# Patient Record
Sex: FEMALE | HISPANIC OR LATINO | Employment: UNEMPLOYED | URBAN - METROPOLITAN AREA
[De-identification: names, ages, dates, MRNs, and addresses within clinical notes are randomized per-mention and may not be internally consistent; named-entity substitution may affect disease eponyms.]

---

## 2017-09-14 ENCOUNTER — APPOINTMENT (OUTPATIENT)
Dept: RADIOLOGY | Facility: MEDICAL CENTER | Age: 67
End: 2017-09-14
Attending: EMERGENCY MEDICINE
Payer: MEDICAID

## 2017-09-14 ENCOUNTER — RESOLUTE PROFESSIONAL BILLING HOSPITAL PROF FEE (OUTPATIENT)
Dept: HOSPITALIST | Facility: MEDICAL CENTER | Age: 67
End: 2017-09-14
Payer: MEDICAID

## 2017-09-14 ENCOUNTER — HOSPITAL ENCOUNTER (OUTPATIENT)
Facility: MEDICAL CENTER | Age: 67
End: 2017-09-15
Attending: EMERGENCY MEDICINE | Admitting: HOSPITALIST
Payer: MEDICAID

## 2017-09-14 PROBLEM — Z85.3 HISTORY OF BREAST CANCER: Status: ACTIVE | Noted: 2017-09-14

## 2017-09-14 PROBLEM — E87.1 HYPONATREMIA: Status: ACTIVE | Noted: 2017-09-14

## 2017-09-14 PROBLEM — I10 HYPERTENSION: Status: ACTIVE | Noted: 2017-09-14

## 2017-09-14 PROBLEM — E87.6 HYPOKALEMIA: Status: ACTIVE | Noted: 2017-09-14

## 2017-09-14 PROBLEM — I20.9 ANGINA PECTORIS (HCC): Status: ACTIVE | Noted: 2017-09-14

## 2017-09-14 PROBLEM — F41.9 ANXIETY: Status: ACTIVE | Noted: 2017-09-14

## 2017-09-14 LAB
ALBUMIN SERPL BCP-MCNC: 4.4 G/DL (ref 3.2–4.9)
ALBUMIN/GLOB SERPL: 1.2 G/DL
ALP SERPL-CCNC: 97 U/L (ref 30–99)
ALT SERPL-CCNC: 32 U/L (ref 2–50)
ANION GAP SERPL CALC-SCNC: 10 MMOL/L (ref 0–11.9)
AST SERPL-CCNC: 26 U/L (ref 12–45)
BASOPHILS # BLD AUTO: 0.4 % (ref 0–1.8)
BASOPHILS # BLD: 0.04 K/UL (ref 0–0.12)
BILIRUB SERPL-MCNC: 1 MG/DL (ref 0.1–1.5)
BUN SERPL-MCNC: 12 MG/DL (ref 8–22)
CALCIUM SERPL-MCNC: 9.3 MG/DL (ref 8.4–10.2)
CHLORIDE SERPL-SCNC: 99 MMOL/L (ref 96–112)
CO2 SERPL-SCNC: 24 MMOL/L (ref 20–33)
CREAT SERPL-MCNC: 0.76 MG/DL (ref 0.5–1.4)
DEPRECATED D DIMER PPP IA-ACNC: <200 NG/ML(D-DU)
EKG IMPRESSION: NORMAL
EOSINOPHIL # BLD AUTO: 0.08 K/UL (ref 0–0.51)
EOSINOPHIL NFR BLD: 0.7 % (ref 0–6.9)
ERYTHROCYTE [DISTWIDTH] IN BLOOD BY AUTOMATED COUNT: 36 FL (ref 35.9–50)
GFR SERPL CREATININE-BSD FRML MDRD: >60 ML/MIN/1.73 M 2
GLOBULIN SER CALC-MCNC: 3.6 G/DL (ref 1.9–3.5)
GLUCOSE SERPL-MCNC: 95 MG/DL (ref 65–99)
HCT VFR BLD AUTO: 41.1 % (ref 37–47)
HGB BLD-MCNC: 14.6 G/DL (ref 12–16)
IMM GRANULOCYTES # BLD AUTO: 0.04 K/UL (ref 0–0.11)
IMM GRANULOCYTES NFR BLD AUTO: 0.4 % (ref 0–0.9)
LYMPHOCYTES # BLD AUTO: 2.88 K/UL (ref 1–4.8)
LYMPHOCYTES NFR BLD: 26.8 % (ref 22–41)
MAGNESIUM SERPL-MCNC: 2 MG/DL (ref 1.5–2.5)
MCH RBC QN AUTO: 29.2 PG (ref 27–33)
MCHC RBC AUTO-ENTMCNC: 35.5 G/DL (ref 33.6–35)
MCV RBC AUTO: 82.2 FL (ref 81.4–97.8)
MONOCYTES # BLD AUTO: 0.83 K/UL (ref 0–0.85)
MONOCYTES NFR BLD AUTO: 7.7 % (ref 0–13.4)
NEUTROPHILS # BLD AUTO: 6.88 K/UL (ref 2–7.15)
NEUTROPHILS NFR BLD: 64 % (ref 44–72)
NRBC # BLD AUTO: 0 K/UL
NRBC BLD AUTO-RTO: 0 /100 WBC
PLATELET # BLD AUTO: 204 K/UL (ref 164–446)
PMV BLD AUTO: 9 FL (ref 9–12.9)
POTASSIUM SERPL-SCNC: 3.4 MMOL/L (ref 3.6–5.5)
PROT SERPL-MCNC: 8 G/DL (ref 6–8.2)
RBC # BLD AUTO: 5 M/UL (ref 4.2–5.4)
SODIUM SERPL-SCNC: 133 MMOL/L (ref 135–145)
TROPONIN I SERPL-MCNC: <0.02 NG/ML (ref 0–0.04)
TROPONIN I SERPL-MCNC: <0.02 NG/ML (ref 0–0.04)
TSH SERPL DL<=0.005 MIU/L-ACNC: 1.12 UIU/ML (ref 0.35–5.5)
WBC # BLD AUTO: 10.8 K/UL (ref 4.8–10.8)

## 2017-09-14 PROCEDURE — 94760 N-INVAS EAR/PLS OXIMETRY 1: CPT

## 2017-09-14 PROCEDURE — 84443 ASSAY THYROID STIM HORMONE: CPT

## 2017-09-14 PROCEDURE — 99285 EMERGENCY DEPT VISIT HI MDM: CPT

## 2017-09-14 PROCEDURE — 700111 HCHG RX REV CODE 636 W/ 250 OVERRIDE (IP): Performed by: HOSPITALIST

## 2017-09-14 PROCEDURE — 71010 DX-CHEST-PORTABLE (1 VIEW): CPT

## 2017-09-14 PROCEDURE — 83036 HEMOGLOBIN GLYCOSYLATED A1C: CPT

## 2017-09-14 PROCEDURE — 84484 ASSAY OF TROPONIN QUANT: CPT

## 2017-09-14 PROCEDURE — 85025 COMPLETE CBC W/AUTO DIFF WBC: CPT

## 2017-09-14 PROCEDURE — 85379 FIBRIN DEGRADATION QUANT: CPT

## 2017-09-14 PROCEDURE — G0378 HOSPITAL OBSERVATION PER HR: HCPCS

## 2017-09-14 PROCEDURE — A9270 NON-COVERED ITEM OR SERVICE: HCPCS | Performed by: EMERGENCY MEDICINE

## 2017-09-14 PROCEDURE — 73030 X-RAY EXAM OF SHOULDER: CPT | Mod: LT

## 2017-09-14 PROCEDURE — 700102 HCHG RX REV CODE 250 W/ 637 OVERRIDE(OP): Performed by: HOSPITALIST

## 2017-09-14 PROCEDURE — A9270 NON-COVERED ITEM OR SERVICE: HCPCS | Performed by: HOSPITALIST

## 2017-09-14 PROCEDURE — 700102 HCHG RX REV CODE 250 W/ 637 OVERRIDE(OP): Performed by: EMERGENCY MEDICINE

## 2017-09-14 PROCEDURE — 80053 COMPREHEN METABOLIC PANEL: CPT

## 2017-09-14 PROCEDURE — 99220 PR INITIAL OBSERVATION CARE,LEVL III: CPT | Performed by: HOSPITALIST

## 2017-09-14 PROCEDURE — 93005 ELECTROCARDIOGRAM TRACING: CPT | Performed by: EMERGENCY MEDICINE

## 2017-09-14 PROCEDURE — 83735 ASSAY OF MAGNESIUM: CPT

## 2017-09-14 PROCEDURE — 36415 COLL VENOUS BLD VENIPUNCTURE: CPT

## 2017-09-14 RX ORDER — AMOXICILLIN 250 MG
2 CAPSULE ORAL 2 TIMES DAILY
Status: DISCONTINUED | OUTPATIENT
Start: 2017-09-14 | End: 2017-09-15 | Stop reason: HOSPADM

## 2017-09-14 RX ORDER — ASPIRIN 325 MG
325 TABLET ORAL DAILY
Status: DISCONTINUED | OUTPATIENT
Start: 2017-09-14 | End: 2017-09-14

## 2017-09-14 RX ORDER — ACETAMINOPHEN 325 MG/1
650 TABLET ORAL ONCE
Status: COMPLETED | OUTPATIENT
Start: 2017-09-14 | End: 2017-09-14

## 2017-09-14 RX ORDER — POLYETHYLENE GLYCOL 3350 17 G/17G
1 POWDER, FOR SOLUTION ORAL
Status: DISCONTINUED | OUTPATIENT
Start: 2017-09-14 | End: 2017-09-15 | Stop reason: HOSPADM

## 2017-09-14 RX ORDER — ONDANSETRON 4 MG/1
4 TABLET, ORALLY DISINTEGRATING ORAL EVERY 4 HOURS PRN
Status: DISCONTINUED | OUTPATIENT
Start: 2017-09-14 | End: 2017-09-15 | Stop reason: HOSPADM

## 2017-09-14 RX ORDER — ZOLPIDEM TARTRATE 5 MG/1
5 TABLET ORAL
Status: DISCONTINUED | OUTPATIENT
Start: 2017-09-14 | End: 2017-09-15 | Stop reason: HOSPADM

## 2017-09-14 RX ORDER — NITROGLYCERIN 0.4 MG/1
0.4 TABLET SUBLINGUAL
Status: DISCONTINUED | OUTPATIENT
Start: 2017-09-14 | End: 2017-09-15 | Stop reason: HOSPADM

## 2017-09-14 RX ORDER — MORPHINE SULFATE 4 MG/ML
1-2 INJECTION, SOLUTION INTRAMUSCULAR; INTRAVENOUS
Status: DISCONTINUED | OUTPATIENT
Start: 2017-09-14 | End: 2017-09-15 | Stop reason: HOSPADM

## 2017-09-14 RX ORDER — POTASSIUM CHLORIDE 20 MEQ/1
20 TABLET, EXTENDED RELEASE ORAL DAILY
Status: DISCONTINUED | OUTPATIENT
Start: 2017-09-14 | End: 2017-09-15 | Stop reason: HOSPADM

## 2017-09-14 RX ORDER — BISACODYL 10 MG
10 SUPPOSITORY, RECTAL RECTAL
Status: DISCONTINUED | OUTPATIENT
Start: 2017-09-14 | End: 2017-09-15 | Stop reason: HOSPADM

## 2017-09-14 RX ORDER — ACETAMINOPHEN 325 MG/1
650 TABLET ORAL EVERY 6 HOURS PRN
Status: DISCONTINUED | OUTPATIENT
Start: 2017-09-14 | End: 2017-09-15 | Stop reason: HOSPADM

## 2017-09-14 RX ORDER — GABAPENTIN 300 MG/1
300 CAPSULE ORAL
COMMUNITY
End: 2022-01-31 | Stop reason: SDUPTHER

## 2017-09-14 RX ORDER — LOSARTAN POTASSIUM 25 MG/1
25 TABLET ORAL DAILY
COMMUNITY
End: 2022-01-31

## 2017-09-14 RX ORDER — ASPIRIN 81 MG/1
324 TABLET, CHEWABLE ORAL DAILY
Status: DISCONTINUED | OUTPATIENT
Start: 2017-09-14 | End: 2017-09-14

## 2017-09-14 RX ORDER — LOSARTAN POTASSIUM 25 MG/1
25 TABLET ORAL DAILY
Status: DISCONTINUED | OUTPATIENT
Start: 2017-09-14 | End: 2017-09-15 | Stop reason: HOSPADM

## 2017-09-14 RX ORDER — GABAPENTIN 300 MG/1
300 CAPSULE ORAL
Status: DISCONTINUED | OUTPATIENT
Start: 2017-09-14 | End: 2017-09-15 | Stop reason: HOSPADM

## 2017-09-14 RX ORDER — LABETALOL HYDROCHLORIDE 5 MG/ML
10 INJECTION, SOLUTION INTRAVENOUS EVERY 4 HOURS PRN
Status: DISCONTINUED | OUTPATIENT
Start: 2017-09-14 | End: 2017-09-15 | Stop reason: HOSPADM

## 2017-09-14 RX ORDER — PAROXETINE 10 MG/1
10 TABLET, FILM COATED ORAL DAILY
COMMUNITY
End: 2022-01-31 | Stop reason: SDUPTHER

## 2017-09-14 RX ORDER — ASPIRIN 600 MG/1
300 SUPPOSITORY RECTAL DAILY
Status: DISCONTINUED | OUTPATIENT
Start: 2017-09-14 | End: 2017-09-14

## 2017-09-14 RX ORDER — PAROXETINE 10 MG/1
10 TABLET, FILM COATED ORAL DAILY
Status: DISCONTINUED | OUTPATIENT
Start: 2017-09-14 | End: 2017-09-15 | Stop reason: HOSPADM

## 2017-09-14 RX ORDER — HEPARIN SODIUM 5000 [USP'U]/ML
5000 INJECTION, SOLUTION INTRAVENOUS; SUBCUTANEOUS EVERY 8 HOURS
Status: DISCONTINUED | OUTPATIENT
Start: 2017-09-14 | End: 2017-09-15 | Stop reason: HOSPADM

## 2017-09-14 RX ORDER — PROMETHAZINE HYDROCHLORIDE 25 MG/1
12.5-25 TABLET ORAL EVERY 4 HOURS PRN
Status: DISCONTINUED | OUTPATIENT
Start: 2017-09-14 | End: 2017-09-15 | Stop reason: HOSPADM

## 2017-09-14 RX ORDER — ONDANSETRON 2 MG/ML
4 INJECTION INTRAMUSCULAR; INTRAVENOUS EVERY 4 HOURS PRN
Status: DISCONTINUED | OUTPATIENT
Start: 2017-09-14 | End: 2017-09-15 | Stop reason: HOSPADM

## 2017-09-14 RX ORDER — PROMETHAZINE HYDROCHLORIDE 25 MG/1
12.5-25 SUPPOSITORY RECTAL EVERY 4 HOURS PRN
Status: DISCONTINUED | OUTPATIENT
Start: 2017-09-14 | End: 2017-09-15 | Stop reason: HOSPADM

## 2017-09-14 RX ADMIN — HEPARIN SODIUM 5000 UNITS: 5000 INJECTION, SOLUTION INTRAVENOUS; SUBCUTANEOUS at 20:24

## 2017-09-14 RX ADMIN — LOSARTAN POTASSIUM 25 MG: 25 TABLET, FILM COATED ORAL at 16:28

## 2017-09-14 RX ADMIN — ACETAMINOPHEN 650 MG: 325 TABLET, FILM COATED ORAL at 21:16

## 2017-09-14 RX ADMIN — GABAPENTIN 300 MG: 300 CAPSULE ORAL at 20:24

## 2017-09-14 RX ADMIN — POTASSIUM CHLORIDE 20 MEQ: 1500 TABLET, EXTENDED RELEASE ORAL at 16:27

## 2017-09-14 RX ADMIN — HEPARIN SODIUM 5000 UNITS: 5000 INJECTION, SOLUTION INTRAVENOUS; SUBCUTANEOUS at 16:29

## 2017-09-14 RX ADMIN — ACETAMINOPHEN 650 MG: 325 TABLET, FILM COATED ORAL at 13:15

## 2017-09-14 RX ADMIN — PAROXETINE 10 MG: 10 TABLET, FILM COATED ORAL at 16:28

## 2017-09-14 ASSESSMENT — PATIENT HEALTH QUESTIONNAIRE - PHQ9
2. FEELING DOWN, DEPRESSED, IRRITABLE, OR HOPELESS: NOT AT ALL
SUM OF ALL RESPONSES TO PHQ9 QUESTIONS 1 AND 2: 0
1. LITTLE INTEREST OR PLEASURE IN DOING THINGS: NOT AT ALL
SUM OF ALL RESPONSES TO PHQ QUESTIONS 1-9: 0

## 2017-09-14 ASSESSMENT — LIFESTYLE VARIABLES
EVER_SMOKED: NEVER
ALCOHOL_USE: NO
EVER_SMOKED: NEVER

## 2017-09-14 ASSESSMENT — PAIN SCALES - GENERAL
PAINLEVEL_OUTOF10: 9
PAINLEVEL_OUTOF10: 0
PAINLEVEL_OUTOF10: 8
PAINLEVEL_OUTOF10: 0

## 2017-09-14 NOTE — PROGRESS NOTES
Admit profile completed, assessment complete. Skin assessment completed. Medicated per mar. Plan of care discussed with family and pt.

## 2017-09-14 NOTE — ASSESSMENT & PLAN NOTE
Patient states that the chest pain developed yesterday it is squeezing in sensation  8 out of 10 intensity accompanied by nausea and diaphoresis but no shortness of breath. Initially the patient said it's nonradiating but now she says that it is radiating to her right and left arm. The patient at this point will be admitted to the telemetric unit for evaluation with serial cardiac markers and a normal stress test in the morning in the meantime I will optimize her with medications including aspirin beta blocker and nitroglycerin and morphine as well as oxygen per protocol.

## 2017-09-14 NOTE — ASSESSMENT & PLAN NOTE
Continue at this point with blood pressure management keep systolic blood pressure under 140 diastolic under 90.

## 2017-09-14 NOTE — H&P
Hospital Medicine History and Physical    Date of Service  9/14/2017    Chief Complaint  Chief Complaint   Patient presents with   • Shortness of Breath     x2-3 days   • Arm Pain     L arm pain x2-3 days. starts at shoulder and radiates down arm,       History of Presenting Illness  66 y.o. female who presented 9/14/2017 with ongoing chest pain. Patient developed chest pain yesterday evening and is 8 out of 10 intensity accompanied by squeezing sensation nausea and diaphoresis. The patient does have risk factors with family as well as her hypertension the patient does at this point will be admitted to the telemetric unit for evaluation with surgery cardiac markers telemetric monitoring as well as a stress test in the morning. She will be optimized on medical management in the meantime. We will give her IV morphine 1-2 mg every 4 hours when necessary for severe pain.   Primary Care Physician  Nic Parsons M.D.    Consultants  None    Code Status  Full code    Review of Systems  ROS  Complaints of chest pain that is squeezing, complaints of nausea, complains of diaphoresis, complains of back pain, complains of left arm numbness. Denies any shortness of breath denies any abdominal pain denies any problems with leg pain or leg swelling all other review systems were reviewed and are negative.    Past Medical History  Past Medical History:   Diagnosis Date   • Cancer (CMS-HCC)     breast   • Hypertension    • Psychiatric disorder     anxiety       Surgical History  Past Surgical History:   Procedure Laterality Date   • RAIN BY LAPAROSCOPY  1/18/2010    Performed by NATASHA SCHWARTZ at SURGERY [facility] ORS   • LUMPECTOMY         Medications  No current facility-administered medications on file prior to encounter.      No current outpatient prescriptions on file prior to encounter.       Family History  History reviewed. No pertinent family history.    Social History  Social History   Substance Use Topics   •  Smoking status: Never Smoker   • Smokeless tobacco: Never Used   • Alcohol use No       Allergies  Allergies   Allergen Reactions   • Advil [Ibuprofen Micronized]    • Aspirin         Physical Exam  Laboratory   Hemodynamics  No data recorded.      Pulse  Av.3  Min: 84  Max: 93    Blood Pressure: 147/92, NIBP: 134/64      Respiratory      Respiration: 18, Pulse Oximetry: 95 %             Physical Exam  66-year-old female who appears to be in no acute distress. She does have left arm numbness.  Head is atraumatic eyes follow in normal range of gaze, pupils are equal round reactive bilaterally sclerae anicteric conjunctiva is of normal hue.  Midline without discharge no nasal fracture, no epistaxis.  Ears bilaterally are intact no mastoid tenderness no discharge from the ear canals are token also patent.  Oral cavity moist mucous members no piriformis infection in the oral cavity.  Neck soft supple no JVD carotid bruits thyromegaly or lymphadenopathy appreciated.  Chest will seek with inspiration and expiration no paradoxical motion no reversible chest with tenderness.  Heart S1-S2 no murmurs or gallops detected.  Lungs diminished breath sounds at the bases mild atelectasis no rales no rhonchi.  Abdomen is soft bowel sounds present all 4 quadrants no hepatomegaly, no splenomegaly, no rebound, no tenderness.  Gutierrez exam deferred, rectal exam deferred.  Upper and lower extremity is positive pulses no edema good muscle strength and muscle tone positive deep tendon reflexes patient does favor her left arm which at this point seems to be decreased in strength and and its toll.  Skin normal turgor no rashes or abrasions identified.  Neurologically cranial nerves II 12 grossly within normal limits without any focal deficits appreciated.   Recent Labs      17   1155   WBC  10.8   RBC  5.00   HEMOGLOBIN  14.6   HEMATOCRIT  41.1   MCV  82.2   MCH  29.2   MCHC  35.5*   RDW  36.0   PLATELETCT  204   MPV  9.0     Recent  Labs      09/14/17   1155   SODIUM  133*   POTASSIUM  3.4*   CHLORIDE  99   CO2  24   GLUCOSE  95   BUN  12   CREATININE  0.76   CALCIUM  9.3     Recent Labs      09/14/17   1155   ALTSGPT  32   ASTSGOT  26   ALKPHOSPHAT  97   TBILIRUBIN  1.0   GLUCOSE  95                 Lab Results   Component Value Date    TROPONINI <0.02 09/14/2017     Urinalysis:  No results found for: SPECGRAVITY, GLUCOSEUR, KETONES, NITRITE, WBCURINE, RBCURINE, BACTERIA, EPITHELCELL     Imaging  DX-SHOULDER 2+ LEFT   Final Result      No evidence of fracture or arthropathy.      DX-CHEST-PORTABLE (1 VIEW)   Final Result      No evidence of acute cardiopulmonary process.      NM-CARDIAC STRESS TEST    (Results Pending)        Assessment/Plan     I anticipate this patient is appropriate for observation status at this time.    Angina pectoris (CMS-[facility])- (present on admission)   Assessment & Plan    Patient states that the chest pain developed yesterday it is squeezing in sensation  8 out of 10 intensity accompanied by nausea and diaphoresis but no shortness of breath. Initially the patient said it's nonradiating but now she says that it is radiating to her right and left arm. The patient at this point will be admitted to the telemetric unit for evaluation with serial cardiac markers and a normal stress test in the morning in the meantime I will optimize her with medications including aspirin beta blocker and nitroglycerin and morphine as well as oxygen per protocol.        Hypertension- (present on admission)   Assessment & Plan    Continue at this point with blood pressure management keep systolic blood pressure under 140 diastolic under 90.        Hyponatremia- (present on admission)   Assessment & Plan    Sodium is low at 133 give fluid resuscitation and monitor sodium levels.        Hypokalemia- (present on admission)   Assessment & Plan    Potassium is low at 3.4 we'll supplement and monitor potassium levels.        Anxiety- (present on  admission)   Assessment & Plan    Continue an anxiolytic management.        History of breast cancer- (present on admission)   Assessment & Plan    In remission and stable.            VTE prophylaxis:Sequentials .

## 2017-09-14 NOTE — ED NOTES
Chief Complaint   Patient presents with   • Shortness of Breath     x2-3 days   • Arm Pain     L arm pain x2-3 days. starts at shoulder and radiates down arm,

## 2017-09-14 NOTE — ED PROVIDER NOTES
ED Provider Note    CHIEF COMPLAINT  Chief Complaint   Patient presents with   • Shortness of Breath     x2-3 days   • Arm Pain     L arm pain x2-3 days. starts at shoulder and radiates down arm,       HPI  Karyn Arshad is a 66 y.o. female who presentsFor evaluation of chest pain shortness of redness for the past to 3 days. The patient also complains left arm pain when she lifts her arm. She denies any pleuritic pain. She denies any history of coronary artery disease. The patient has had a little diaphoresis. Nothing seems to make her symptoms worse or better she has a history of cancer to the breast hypertension and anxiety. She has no pain radiating through to the back.    REVIEW OF SYSTEMS  See HPI for further details. All other systems reviewed and negative except as noted above    PAST MEDICAL HISTORY  Past Medical History:   Diagnosis Date   • Cancer (CMS-HCC)     breast   • Hypertension    • Psychiatric disorder     anxiety       FAMILY HISTORY  History reviewed. No pertinent family history.    SOCIAL HISTORY  Social History     Social History   • Marital status:      Spouse name: N/A   • Number of children: N/A   • Years of education: N/A     Social History Main Topics   • Smoking status: Never Smoker   • Smokeless tobacco: Never Used   • Alcohol use No   • Drug use: No   • Sexual activity: Not on file     Other Topics Concern   • Not on file     Social History Narrative   • No narrative on file       SURGICAL HISTORY  Past Surgical History:   Procedure Laterality Date   • RAIN BY LAPAROSCOPY  1/18/2010    Performed by NATASHA SCHWARTZ at SURGERY [facility]   • LUMPECTOMY         CURRENT MEDICATIONS  Home Medications     Reviewed by Abhishek Fagan M.D. (Physician) on 09/14/17 at 1504  Med List Status: Complete   Medication Last Dose Status   gabapentin (NEURONTIN) 300 MG Cap  Active   losartan (COZAAR) 25 MG Tab  Active   paroxetine (PAXIL) 10 MG Tab  Active                  ALLERGIES  Allergies   Allergen Reactions   • Advil [Ibuprofen Micronized]    • Aspirin        PHYSICAL EXAM  VITAL SIGNS: /92   Pulse 78   Resp 18   Wt 72 kg (158 lb 11.7 oz)   SpO2 93%   BMI 31.00 kg/m²    Constitutional :  Well developed, Well nourished, No acute distress, Non-toxic appearance.   HENT:His atraumatic normocephalic moist mucous membranes  Eyes: Normal appearing nonicteric  Neck: Normal range of motion, No tenderness, Supple, No stridor.   Lymphatic: No cervical adenopathy    Cardiovascular: Normal heart rate, Normal rhythm, No murmurs, No rubs, No gallops.   Thorax & Lungs: Equal breath sounds bilaterally no rales rhonchi no chest wall tenderness  Skin: Warm, Dry, No erythema, No rash.   Abdomen soft nontender no distention  Chest walls nontender move symmetrically  Left shoulder is notable for pain to the shoulder with lifting of the left arm reproducing her pain distal pulses intact in the radial ulnar area  Neurologic the patient is awake alert without focal findings  Psychiatric appropriate mood and affect. No impairment of judgment      DX-SHOULDER 2+ LEFT   Final Result      No evidence of fracture or arthropathy.      DX-CHEST-PORTABLE (1 VIEW)   Final Result      No evidence of acute cardiopulmonary process.      NM-CARDIAC STRESS TEST    (Results Pending)       Results for orders placed or performed during the hospital encounter of 09/14/17   CBC w/ Differential   Result Value Ref Range    WBC 10.8 4.8 - 10.8 K/uL    RBC 5.00 4.20 - 5.40 M/uL    Hemoglobin 14.6 12.0 - 16.0 g/dL    Hematocrit 41.1 37.0 - 47.0 %    MCV 82.2 81.4 - 97.8 fL    MCH 29.2 27.0 - 33.0 pg    MCHC 35.5 (H) 33.6 - 35.0 g/dL    RDW 36.0 35.9 - 50.0 fL    Platelet Count 204 164 - 446 K/uL    MPV 9.0 9.0 - 12.9 fL    Neutrophils-Polys 64.00 44.00 - 72.00 %    Lymphocytes 26.80 22.00 - 41.00 %    Monocytes 7.70 0.00 - 13.40 %    Eosinophils 0.70 0.00 - 6.90 %    Basophils 0.40 0.00 - 1.80 %    Immature  Granulocytes 0.40 0.00 - 0.90 %    Nucleated RBC 0.00 /100 WBC    Neutrophils (Absolute) 6.88 2.00 - 7.15 K/uL    Lymphs (Absolute) 2.88 1.00 - 4.80 K/uL    Monos (Absolute) 0.83 0.00 - 0.85 K/uL    Eos (Absolute) 0.08 0.00 - 0.51 K/uL    Baso (Absolute) 0.04 0.00 - 0.12 K/uL    Immature Granulocytes (abs) 0.04 0.00 - 0.11 K/uL    NRBC (Absolute) 0.00 K/uL   Complete Metabolic Panel (CMP)   Result Value Ref Range    Sodium 133 (L) 135 - 145 mmol/L    Potassium 3.4 (L) 3.6 - 5.5 mmol/L    Chloride 99 96 - 112 mmol/L    Co2 24 20 - 33 mmol/L    Anion Gap 10.0 0.0 - 11.9    Glucose 95 65 - 99 mg/dL    Bun 12 8 - 22 mg/dL    Creatinine 0.76 0.50 - 1.40 mg/dL    Calcium 9.3 8.4 - 10.2 mg/dL    AST(SGOT) 26 12 - 45 U/L    ALT(SGPT) 32 2 - 50 U/L    Alkaline Phosphatase 97 30 - 99 U/L    Total Bilirubin 1.0 0.1 - 1.5 mg/dL    Albumin 4.4 3.2 - 4.9 g/dL    Total Protein 8.0 6.0 - 8.2 g/dL    Globulin 3.6 (H) 1.9 - 3.5 g/dL    A-G Ratio 1.2 g/dL   Troponin STAT   Result Value Ref Range    Troponin I <0.02 0.00 - 0.04 ng/mL   D-Dimer (only helpful in low pre-test probability wells critieria. Do not order if patient ruled out by PERC criteria. See Weblinks at top of Labs section)   Result Value Ref Range    D-Dimer Screen <200 <250 ng/mL(D-DU)   ESTIMATED GFR   Result Value Ref Range    GFR If African American >60 >60 mL/min/1.73 m 2    GFR If Non African American >60 >60 mL/min/1.73 m 2   EKG (NOW)   Result Value Ref Range    Report       [facility] Emergency Dept.    Test Date:  2017  Pt Name:    SHAHEEN VALENZUELA             Department: [facility]  MRN:        [id number]                      Room:       [facility] 8  Gender:     F                            Technician: TALAT  :        1950                   Requested By:BIRD FITZGERALD  Order #:    [id number]                    Reading MD: BIRD FITZGERALD MD    Measurements  Intervals                                Axis  Rate:       89                            P:          35  WA:         148                          QRS:        5  QRSD:       74                           T:          5  QT:         368  QTc:        448    Interpretive Statements  SINUS RHYTHM  Compared to ECG 01/17/2010 05:44:27  No significant changes    Electronically Signed On 9- 15:20:59 PDT by TETO FITZGERALD MD       EKG Interpretation    Interpreted by me    Rhythm: normal sinus   Rate: normal  Axis: normal  Ectopy: none  Conduction: normal  ST Segments: no acute change  T Waves: no acute change  Q Waves: none    Clinical Impression: no acute changes and normal EKG      COURSE & MEDICAL DECISION MAKING  Pertinent Labs & Imaging studies reviewed. (See chart for details)  The patient presents with a complaint of chest pain intermittently for 2-3 days she does have risk factors for heart disease such as hypertension. Given the character and presentation I think she should be admitted to rule out acute coronary syndrome. In addition she has pain to her left shoulder worse with movement I suspect this is some type of tendinitis possible ligamentous origin. Discussed case with hospitalist Dr. Lo will be admitting. She has no signs or symptoms of aortic dissection pulmonary embolus with a negative d-dimer    FINAL IMPRESSION  1. Acute chest pain rule out ACS  2. Left shoulder pain suspect tendinitis  3.      Electronically signed by: Teto Fitzgerald, 9/14/2017

## 2017-09-15 ENCOUNTER — APPOINTMENT (OUTPATIENT)
Dept: RADIOLOGY | Facility: MEDICAL CENTER | Age: 67
End: 2017-09-15
Attending: HOSPITALIST
Payer: MEDICAID

## 2017-09-15 ENCOUNTER — PATIENT OUTREACH (OUTPATIENT)
Dept: HEALTH INFORMATION MANAGEMENT | Facility: OTHER | Age: 67
End: 2017-09-15

## 2017-09-15 VITALS
DIASTOLIC BLOOD PRESSURE: 62 MMHG | BODY MASS INDEX: 37.7 KG/M2 | HEART RATE: 73 BPM | TEMPERATURE: 98.6 F | OXYGEN SATURATION: 93 % | SYSTOLIC BLOOD PRESSURE: 126 MMHG | RESPIRATION RATE: 18 BRPM | HEIGHT: 55 IN | WEIGHT: 162.92 LBS

## 2017-09-15 LAB
ANION GAP SERPL CALC-SCNC: 9 MMOL/L (ref 0–11.9)
BUN SERPL-MCNC: 14 MG/DL (ref 8–22)
CALCIUM SERPL-MCNC: 9 MG/DL (ref 8.4–10.2)
CHLORIDE SERPL-SCNC: 101 MMOL/L (ref 96–112)
CO2 SERPL-SCNC: 24 MMOL/L (ref 20–33)
CREAT SERPL-MCNC: 0.74 MG/DL (ref 0.5–1.4)
ERYTHROCYTE [DISTWIDTH] IN BLOOD BY AUTOMATED COUNT: 36.2 FL (ref 35.9–50)
EST. AVERAGE GLUCOSE BLD GHB EST-MCNC: 120 MG/DL
GFR SERPL CREATININE-BSD FRML MDRD: >60 ML/MIN/1.73 M 2
GLUCOSE SERPL-MCNC: 125 MG/DL (ref 65–99)
HBA1C MFR BLD: 5.8 % (ref 0–5.6)
HCT VFR BLD AUTO: 40.6 % (ref 37–47)
HGB BLD-MCNC: 14.2 G/DL (ref 12–16)
LV EJECT FRACT  99904: 70
LV EJECT FRACT MOD 2C 99903: 76.37
LV EJECT FRACT MOD 4C 99902: 68.76
LV EJECT FRACT MOD BP 99901: 71.96
MCH RBC QN AUTO: 28.8 PG (ref 27–33)
MCHC RBC AUTO-ENTMCNC: 35 G/DL (ref 33.6–35)
MCV RBC AUTO: 82.4 FL (ref 81.4–97.8)
PLATELET # BLD AUTO: 206 K/UL (ref 164–446)
PMV BLD AUTO: 9 FL (ref 9–12.9)
POTASSIUM SERPL-SCNC: 3.5 MMOL/L (ref 3.6–5.5)
RBC # BLD AUTO: 4.93 M/UL (ref 4.2–5.4)
SODIUM SERPL-SCNC: 134 MMOL/L (ref 135–145)
TROPONIN I SERPL-MCNC: <0.02 NG/ML (ref 0–0.04)
WBC # BLD AUTO: 9 K/UL (ref 4.8–10.8)

## 2017-09-15 PROCEDURE — 93306 TTE W/DOPPLER COMPLETE: CPT | Mod: 26 | Performed by: INTERNAL MEDICINE

## 2017-09-15 PROCEDURE — 93306 TTE W/DOPPLER COMPLETE: CPT

## 2017-09-15 PROCEDURE — 94760 N-INVAS EAR/PLS OXIMETRY 1: CPT

## 2017-09-15 PROCEDURE — 84484 ASSAY OF TROPONIN QUANT: CPT

## 2017-09-15 PROCEDURE — G0378 HOSPITAL OBSERVATION PER HR: HCPCS

## 2017-09-15 PROCEDURE — 85027 COMPLETE CBC AUTOMATED: CPT

## 2017-09-15 PROCEDURE — 80048 BASIC METABOLIC PNL TOTAL CA: CPT

## 2017-09-15 PROCEDURE — 99217 PR OBSERVATION CARE DISCHARGE: CPT | Performed by: FAMILY MEDICINE

## 2017-09-15 PROCEDURE — A9270 NON-COVERED ITEM OR SERVICE: HCPCS | Performed by: HOSPITALIST

## 2017-09-15 PROCEDURE — 700111 HCHG RX REV CODE 636 W/ 250 OVERRIDE (IP): Performed by: HOSPITALIST

## 2017-09-15 PROCEDURE — 700102 HCHG RX REV CODE 250 W/ 637 OVERRIDE(OP): Performed by: HOSPITALIST

## 2017-09-15 PROCEDURE — 99285 EMERGENCY DEPT VISIT HI MDM: CPT

## 2017-09-15 PROCEDURE — 700111 HCHG RX REV CODE 636 W/ 250 OVERRIDE (IP)

## 2017-09-15 PROCEDURE — A9502 TC99M TETROFOSMIN: HCPCS

## 2017-09-15 RX ORDER — REGADENOSON 0.08 MG/ML
INJECTION, SOLUTION INTRAVENOUS
Status: COMPLETED
Start: 2017-09-15 | End: 2017-09-15

## 2017-09-15 RX ADMIN — HEPARIN SODIUM 5000 UNITS: 5000 INJECTION, SOLUTION INTRAVENOUS; SUBCUTANEOUS at 13:30

## 2017-09-15 RX ADMIN — PAROXETINE 10 MG: 10 TABLET, FILM COATED ORAL at 11:23

## 2017-09-15 RX ADMIN — POTASSIUM CHLORIDE 20 MEQ: 1500 TABLET, EXTENDED RELEASE ORAL at 11:23

## 2017-09-15 RX ADMIN — HEPARIN SODIUM 5000 UNITS: 5000 INJECTION, SOLUTION INTRAVENOUS; SUBCUTANEOUS at 05:49

## 2017-09-15 RX ADMIN — LOSARTAN POTASSIUM 25 MG: 25 TABLET, FILM COATED ORAL at 11:22

## 2017-09-15 RX ADMIN — REGADENOSON 0.4 MG: 0.08 INJECTION, SOLUTION INTRAVENOUS at 09:08

## 2017-09-15 RX ADMIN — DOCUSATE SODIUM AND SENNOSIDES 2 TABLET: 8.6; 5 TABLET, FILM COATED ORAL at 11:23

## 2017-09-15 ASSESSMENT — PAIN SCALES - GENERAL: PAINLEVEL_OUTOF10: 2

## 2017-09-15 ASSESSMENT — LIFESTYLE VARIABLES: EVER_SMOKED: NEVER

## 2017-09-15 NOTE — PROGRESS NOTES
Received bedside report from NOC RNAyse.  Discussed POC.  Pt sleeping in bed, low fowlers, no s/s of acute distress, calm, personal belongings w/in reach, safety precautions in place, assumed pt care, will CTM.

## 2017-09-15 NOTE — PROGRESS NOTES
Pt discharged home w/all personal belongings.  VSS and all lines discharged.  Pt discharged w/PCP and cardio follow up appt.  Pt verbalized understanding of all discharge instructions.  Pt transported to family vehicle via wheelchair and hospital escort (CNA).

## 2017-09-15 NOTE — CARE PLAN
Problem: Safety  Goal: Will remain free from falls  Instructed patient to call for assistance when needed.    Problem: Pain Management  Goal: Pain level will decrease to patient's comfort goal    Intervention: Follow pain managment plan developed in collaboration with patient and Interdisciplinary Team  Pain medication provided per physician order.

## 2017-09-15 NOTE — DISCHARGE INSTRUCTIONS
Discharge Instructions    Discharged to home by car with relative. Discharged via wheelchair, hospital escort: Yes.  Special equipment needed: Not Applicable    Be sure to schedule a follow-up appointment with your primary care doctor or any specialists as instructed.     Discharge Plan:   Diet Plan: Discussed  Activity Level: Discussed  Confirmed Follow up Appointment: Appointment Scheduled  Confirmed Symptoms Management: Discussed  Medication Reconciliation Updated: Yes  Influenza Vaccine Indication: Patient Refuses    I understand that a diet low in cholesterol, fat, and sodium is recommended for good health. Unless I have been given specific instructions below for another diet, I accept this instruction as my diet prescription.   Other diet: Regular    Special Instructions: None    · Is patient discharged on Warfarin / Coumadin?   No     · Is patient Post Blood Transfusion?  No    Depression / Suicide Risk    As you are discharged from this [facility] facility, it is important to learn how to keep safe from harming yourself.    Recognize the warning signs:  · Abrupt changes in personality, positive or negative- including increase in energy   · Giving away possessions  · Change in eating patterns- significant weight changes-  positive or negative  · Change in sleeping patterns- unable to sleep or sleeping all the time   · Unwillingness or inability to communicate  · Depression  · Unusual sadness, discouragement and loneliness  · Talk of wanting to die  · Neglect of personal appearance   · Rebelliousness- reckless behavior  · Withdrawal from people/activities they love  · Confusion- inability to concentrate     If you or a loved one observes any of these behaviors or has concerns about self-harm, here's what you can do:  · Talk about it- your feelings and reasons for harming yourself  · Remove any means that you might use to hurt yourself (examples: pills, rope, extension cords, firearm)  · Get professional  help from the community (Mental Health, Substance Abuse, psychological counseling)  · Do not be alone:Call your Safe Contact- someone whom you trust who will be there for you.  · Call your local CRISIS HOTLINE 096-8986 or 438-680-5649  · Call your local Children's Mobile Crisis Response Team [place] (385) 648-5877 or www.[employer]iam  · Call the toll free National Suicide Prevention Hotlines   · National Suicide Prevention Lifeline 286-157-AEWB (9811)  · National [employer] Line Network 800-SUICIDE (602-9498)        Angina de pecho  (Angina Pectoris)  La angina de pecho, a menudo llamada simplemente angina, es yaya sensación de molestia extrema en el pecho, el kelsea o el brazo, causada por la falta de yumi en la capa media y más gruesa de la pared del corazón (miocardio). Hay cuatro tipos de angina de pecho.  · Angina estable. La frecuencia y la duración de los episodios de angina estable son predecibles. Por lo general, la causa es la actividad física, el estrés emocional o la excitación. La angina estable suele durar unos pocos minutos y a menudo se lopez tomando un medicamento que se coloca debajo de la lengua, denominado nitroglicerina sublingual.  · Angina inestable. La angina inestable puede ocurrir aun cuando el cuerpo realiza poco o ningún esfuerzo físico, e incluso mientras duerme o descansa. Puede aumentar de repente en gravedad o frecuencia. No se puede aliviar con nitroglicerina sublingual y puede durar hasta 30 minutos.  · Angina microvascular. Enriqueta tipo de angina se produce por un trastorno de los vasos sanguíneos diminutos llamados arteriolas. Es más común en las mujeres. El dolor puede ser más intenso y durar más que otros tipos de angina de pecho.  · Angina de Prinzmetal o angina variante. Enriqueta tipo de angina de pecho es poco frecuente y por lo general ocurre cuando realiza poco o ningún esfuerzo físico. Ocurre sobre todo en las primeras horas de la mañana.  CAUSAS  La ateroesclerosis causa  la angina. Es la acumulación de grasa y colesterol (placa) en el interior de las arterias. Con el tiempo, la placa puede estrechar u obstruir la arteria y esto reducirá el flujo sanguíneo al corazón. La placa también puede debilitarse y desprenderse en yaya arteria coronaria para formar un coágulo y causar yaya obstrucción repentina.  FACTORES DE RIESGO  Los factores de riesgo comunes a hombres y mujeres incluyen lo siguiente:  · Niveles elevados de colesterol.  · Presión arterial elevada (hipertensión).  · Consumo de tabaco.  · Diabetes.  · Antecedentes familiares de angina.  · Obesidad.  · La falta de actividad física.  · Yaya dieta con alto contenido de grasas saturadas.  Las mujeres presentan un riesgo más alto de tener angina si:  · Tienen de 55 años.  · Están en la posmenopausia.  SÍNTOMAS  Muchas personas no presentan síntomas emma las primeras etapas de la angina. A medida que la afección progresa, los síntomas comunes a hombres y mujeres pueden incluir lo siguiente:  · Dolor en el pecho.  ¨ Se describe mary un dolor que aplasta o retuerce el pecho, o yaya opresión, presión, distensión o pesadez en el pecho.  ¨ El dolor puede durar más de unos minutos o puede detenerse y volver a presentarse.  · Dolor en los brazos, el kelsea, la mandíbula o la espalda.  · Acidez estomacal o empacho sin causa aparente.  · Falta de aire.  · Náuseas.  · Sudor frío repentino.  · Sensación repentina de desvanecimiento.  Muchas mujeres tienen molestias en el pecho y algunos de los otros síntomas. Sin embargo, a menudo presentan síntomas diferentes (atípicos), mary:   · Fatiga.  · Sensación inexplicable de nerviosismo o ansiedad.  · Debilidad sin causa aparente.  · Mareos o desmayos.  En ocasiones, las mujeres pueden tener angina sin presentar síntomas.  DIAGNÓSTICO   Entre los estudios para diagnosticar la angina se incluyen los siguientes:  · ECG (electrocardiograma).  · Prueba de esfuerzo. Se utiliza para detectar signos de  obstrucción cuando el corazón se somete a ejercicio.  · Prueba de esfuerzo farmacológica. Se utiliza para detectar signos de obstrucción cuando el corazón se somete a esfuerzo mediante el uso de un medicamento.  · Análisis de yumi.  · Angiografía coronaria. En srikanth procedimiento se detecta si hay obstrucción en las arterias coronarias.  TRATAMIENTO   El tratamiento de la angina puede incluir lo siguiente:  · Adquirir hábitos saludables para reducir o controlar los factores de riesgo.  · Medicamentos.  · Colocación de stent de arteria coronaria. Un stent ayuda a mantener abierta yaya arteria.  · Angioplastia coronaria. En srikanth procedimiento se ensancha yaya arteria estrechada u obstruida.  · Cirugía de revascularización arterial coronaria. Esta permitirá que la yumi pase la obstrucción (revascularización) para llegar al corazón.  INSTRUCCIONES PARA EL CUIDADO EN EL HOGAR   · Unicoi los medicamentos solamente mary se lo haya indicado el médico.  · No tome los siguientes medicamentos a menos que el médico lo autorice:  ¨ Antiinflamatorios no esteroides (PAXTON), mary ibuprofeno, naproxeno o celecoxib.  ¨ Suplementos vitamínicos que contienen vitamina A, vitamina E o ambas.  ¨ Tratamientos de reposición hormonal que contienen estrógeno con o sin progestina.  · Controle otros problemas de lorin, mary hipertensión y diabetes, mary se lo haya indicado jansen médico.  · Siga yaya dieta cardiosaludable. El nutricionista puede darle instrucciones sobre opciones de alimentos saludables y los cambios correspondientes.  · Use métodos de cocción saludables, mary asar, grillar, hervir, hornear, cocer al vapor o saltear. Hable con un nutricionista para aprender más acerca de los métodos de cocción saludables.  · Siga un programa de actividad física aprobado por el médico.  · Mantenga un peso saludable. Baje de peso según se lo indique el médico.  · Planifique períodos de descanso cuando se sienta fatigado.  · Aprenda a manejar el  estrés.  · No consuma ningún producto que contenga tabaco, lo que incluye cigarrillos, tabaco de mascar o cigarrillos electrónicos. Si necesita ayuda para dejar de fumar, consulte al médico.  · Si zeina alcohol y jansen médico lo aprueba, limite la ingesta a no más de 1 medida por día. Yaya medida equivale a 12 onzas de cerveza, 5 onzas de vino o 1½ onzas de bebidas alcohólicas de deangelo graduación.  · No consuma drogas.  · Concurra a todas las visitas de control mary se lo haya indicado el médico. Accord es importante.  SOLICITE ATENCIÓN MÉDICA DE INMEDIATO SI:   · Siente dolor en el pecho, el kelsea, el brazo, la mandíbula, el estómago o la espalda que dure más de unos pocos minutos, el dolor es recurrente o no se lopez con nitroglicerina sublingual.  · Tiene sudoraciones intensas sin causa.  · Tiene estos síntomas sin causa aparente:  ¨ Acidez estomacal o empacho.  ¨ Falta de aire o dificultades respiratorias.  ¨ Náuseas o vómitos.  ¨ Fatiga.  ¨ Nerviosismo o ansiedad.  ¨ Debilidad.  ¨ Diarrea.  · Mareos o sensación de desvanecimiento repentinos.  · Se desmaya.  Estos síntomas pueden representar un problema grave que constituye yaya emergencia. No espere hasta que los síntomas desaparezcan. Solicite atención médica de inmediato. Comuníquese con el servicio de emergencias de jansen localidad (911 en los [place]). No conduzca por robson propios medios hasta el hospital.     Esta información no tiene mary fin reemplazar el consejo del médico. Asegúrese de hacerle al médico cualquier pregunta que tenga.     Document Released: 12/18/2006 Document Revised: 01/08/2016  Elsevier Interactive Patient Education ©2016 Elsevier Inc.

## 2017-09-15 NOTE — PROGRESS NOTES
Administered prescribed meds per MAR.  Pt resting in bed, low fowlers, calm/cooperative, denies pain/any immediate needs at this time, pt's family at bedside, personal belongings w/in reach, safety precautions in place, room is clutter free, will CTM.

## 2017-09-15 NOTE — CARE PLAN
Problem: Communication  Goal: The ability to communicate needs accurately and effectively will improve  Outcome: PROGRESSING AS EXPECTED  This RN translating for pt (this RN certified in translation), encouraged pt to verbalize questions and concerns.    Problem: Knowledge Deficit  Goal: Knowledge of disease process/condition, treatment plan, diagnostic tests, and medications will improve  Outcome: PROGRESSING AS EXPECTED  Discussed POC, tx, and meds.  Pt verbalizes understanding of disease process.  Encouraged pt to ask questions.

## 2017-09-15 NOTE — DISCHARGE SUMMARY
CHIEF COMPLAINT ON ADMISSION  Chief Complaint   Patient presents with   • Shortness of Breath     x2-3 days   • Arm Pain     L arm pain x2-3 days. starts at shoulder and radiates down arm,       CODE STATUS  Full Code    HPI & HOSPITAL COURSE  This is a 66 y.o. female here with ATYPICAL CHEST PAIN AND HTN  SHE FEELS BETTER  IF CARDIAC STRESS TEST AND ECHO ARE NEGATIVE THEN DC HOME.  F/U WITH CARDIOLOGY AS AN OUTPT IN 1 TO 2 WEEKS    HTN  CONTINUE WITH LOSARTAN    Therefore, she is discharged in good and stable condition with close outpatient follow-up.    SPECIFIC OUTPATIENT FOLLOW-UP  WITH PCP IN 1 WEEK  CARDIOLOGY IN 1 TO 2 WEEKS    DISCHARGE PROBLEM LIST  Active Problems:    Angina pectoris (CMS-HCC) POA: Yes    Hypokalemia POA: Yes    Hyponatremia POA: Yes    Hypertension POA: Yes    History of breast cancer POA: Yes    Anxiety POA: Yes  Resolved Problems:    * No resolved hospital problems. *      FOLLOW UP  No future appointments.  Nic Parsons M.D.  [address]  [facility] [place] [address]  [phone]    Schedule an appointment as soon as possible for a visit in 2 weeks  Follow Up      MEDICATIONS ON DISCHARGE   Karyn Arshad   Home Medication Instructions ERICKA:[id number]    Printed on:09/15/17 3186   Medication Information                      gabapentin (NEURONTIN) 300 MG Cap  Take 300 mg by mouth every bedtime.             losartan (COZAAR) 25 MG Tab  Take 25 mg by mouth every day.             paroxetine (PAXIL) 10 MG Tab  Take 10 mg by mouth every day.                 DIET  Orders Placed This Encounter   Procedures   • Protocol 1313 Diet Order: Reg, No Decaf, No Caffeine - Cardiac Stress Test Pharmacological     Standing Status:   Standing     Number of Occurrences:   1     Order Specific Question:   Diet:     Answer:   Regular [1]     Order Specific Question:   Miscellaneous modifications:     Answer:   No Decaf, No Caffeine(for test) [11]     Comments:   Protocol 1313 Patient to have no  caffeine for 12 hours prior to exam (decaf, coffee, cola, tea, chocolate)       ACTIVITY  As tolerated.  Weight bearing as tolerated      CONSULTATIONS  NONE    PROCEDURES  NONE    LABORATORY  Lab Results   Component Value Date/Time    SODIUM 134 (L) 09/15/2017 12:04 AM    POTASSIUM 3.5 (L) 09/15/2017 12:04 AM    CHLORIDE 101 09/15/2017 12:04 AM    CO2 24 09/15/2017 12:04 AM    GLUCOSE 125 (H) 09/15/2017 12:04 AM    BUN 14 09/15/2017 12:04 AM    CREATININE 0.74 09/15/2017 12:04 AM        Lab Results   Component Value Date/Time    WBC 9.0 09/15/2017 12:04 AM    HEMOGLOBIN 14.2 09/15/2017 12:04 AM    HEMATOCRIT 40.6 09/15/2017 12:04 AM    PLATELETCT 206 09/15/2017 12:04 AM        Total time of the discharge process exceeds 36 minutes

## 2017-09-15 NOTE — PROGRESS NOTES
Pt's tele summary: sinus rhythm w/no ectopy.      HR 70      NM interval 0.16  QRS complex 0.08  QT interval 0.42          This tele strip placed in pt's chart.

## 2017-09-15 NOTE — PROGRESS NOTES
Nursing care plan includes knowledge deficit, potential for discomfort, potential for compromised cardiac output.  POC includes teaching, comfort measures and reassurance, and access to code cart, cardiology stand by and availability of rapid response team.  Pt verbalizes good understanding of benefits and risks of pharmacological cardiac stress test.  Informed consent obtained.  Lexiscan given, pt developed the following signs/symptoms:sob, head pressure.    VS stable, symptoms resolved.  To waiting room, fluids and/or snack given, awaiting second scan.  Nursing goals met.

## 2017-09-15 NOTE — DISCHARGE PLANNING
Medical Social Work    Referral: Pt discussed at IDT rounds this AM.    Intervention: Per darwinheet, pt lives with adult son and expects to d.c home.  Pt possible d.c home today dependent upon stress test results.  No SS needs identified nor any requests for EVITA Wagner during rounds.      Plan: SW available for any assistance with d.c planning.    Care Transition Team Assessment    Information Source  Information Given By: Patient    Readmission Evaluation  Is this a readmission?: No    Elopement Risk  Legal Hold: No  Ambulatory or Self Mobile in Wheelchair: Yes  Disoriented: No  Psychiatric Symptoms: None  History of Wandering: No  Elopement this Admit: No  Vocalizing Wanting to Leave: No  Displays Behaviors, Body Language Wanting to Leave: No-Not at Risk for Elopement  Elopement Risk: Not at Risk for Elopement    Interdisciplinary Discharge Planning  Does Admitting Nurse Feel This Could be a Complex Discharge?: No  Primary Care Physician: barefied   Lives with - Patient's Self Care Capacity: Adult Children  Patient or legal guardian wants to designate a caregiver (see row info): No  Support Systems: Family Member(s), Friends / Neighbors  Housing / Facility: [address]  Do You Take your Prescribed Medications Regularly: Yes  Able to Return to Previous ADL's: Yes  Mobility Issues: No  Prior Services: None  Patient Expects to be Discharged to:: home  Assistance Needed: No  Durable Medical Equipment: Not Applicable    Discharge Preparedness  What is your plan after discharge?: Home with help  What are your discharge supports?: Child         Finances  Prescription Coverage: Yes    Vision / Hearing Impairment  Vision Impairment : Yes  Right Eye Vision: Wears Glasses (reading only)  Left Eye Vision: Wears Glasses (reading only)  Hearing Impairment : No    Values / Beliefs / Concerns  Values / Beliefs Concerns : No    Advance Directive  Advance Directive?: None    Domestic Abuse  Have you ever been the victim of abuse or  violence?: No  Physical Abuse or Sexual Abuse: No  Verbal Abuse or Emotional Abuse: No    Psychological Assessment  History of Substance Abuse: None         Anticipated Discharge Information  Anticipated discharge disposition: Home

## 2017-09-15 NOTE — PROGRESS NOTES
Received report from jasmin COX (Bhavana). Assumed care of patient, discussed plan of care. Safety precautions in place.

## 2017-09-18 ENCOUNTER — OFFICE VISIT (OUTPATIENT)
Dept: CARDIOLOGY | Facility: MEDICAL CENTER | Age: 67
End: 2017-09-18
Payer: MEDICAID

## 2017-09-18 VITALS
BODY MASS INDEX: 35.18 KG/M2 | OXYGEN SATURATION: 98 % | HEIGHT: 55 IN | HEART RATE: 86 BPM | DIASTOLIC BLOOD PRESSURE: 82 MMHG | SYSTOLIC BLOOD PRESSURE: 130 MMHG | WEIGHT: 152 LBS

## 2017-09-18 DIAGNOSIS — R07.89 NON-CARDIAC CHEST PAIN: ICD-10-CM

## 2017-09-18 DIAGNOSIS — I10 ESSENTIAL HYPERTENSION: ICD-10-CM

## 2017-09-18 PROCEDURE — 99203 OFFICE O/P NEW LOW 30 MIN: CPT | Performed by: INTERNAL MEDICINE

## 2017-09-18 RX ORDER — ACETAMINOPHEN 160 MG
TABLET,DISINTEGRATING ORAL
COMMUNITY

## 2017-09-18 NOTE — PROGRESS NOTES
Subjective:   Karyn Arshad is a 66 y.o. female who presents today for evaluation of a chest pain syndrome leading to a hospitalization 9/15/2017. She describes left shoulder pain which apparently   radiated down the left arm. She became worried and went to the emergency room and was subsequently admitted to rule out myocardial infarction. Serial troponin levels were normal, her initial EKG was normal, as well as her echocardiogram. She did have mild left ventricular hypertrophy but normal left ventricular systolic function. She had a nuclear perfusion study with adenosine which revealed no evidence of ischemia. Over the next 2 days the patient's symptoms have basically subsided although she complains of some left shoulder pain when she gets in certain positions.. Her risk factors for coronary disease include a history of her father having  at age 75. Her mother is alive at age 94 and has a pacemaker. She has a brother with hypertension and hyperlipidemia and diabetes. The patient does not have diabetes but does have hypertension which is been pretty well-controlled on a fairly simple regimen noted below.    She has no orthopnea, PND, pedal edema, or exertional symptoms. There is no exertional dyspnea or chest pain or pressure. She has no regular exercise program but if she walks a normal pace can do so for 45 minutes. She walks very fast or up a flight of stairs she has a little sensation of breathlessness. She's never had a heart problem before, no heart attack or heart murmur.  Past Medical History:   Diagnosis Date   • Cancer (CMS-HCC)     breast   • Hypertension    • Psychiatric disorder     anxiety     Past Surgical History:   Procedure Laterality Date   • RAIN BY LAPAROSCOPY  2010    Performed by NATASHA SCHWARTZ at [facility]   • LUMPECTOMY       History reviewed. No pertinent family history.  History   Smoking Status   • Never Smoker   Smokeless Tobacco   • Never Used  "    Allergies   Allergen Reactions   • Advil [Ibuprofen Micronized]    • Aspirin      Outpatient Encounter Prescriptions as of 9/18/2017   Medication Sig Dispense Refill   • OMEPRAZOLE PO Take  by mouth.     • Cholecalciferol (VITAMIN D3) 2000 UNIT Cap Take  by mouth.     • CALCIUM PO Take  by mouth.     • losartan (COZAAR) 25 MG Tab Take 25 mg by mouth every day.     • paroxetine (PAXIL) 10 MG Tab Take 10 mg by mouth every day.     • gabapentin (NEURONTIN) 300 MG Cap Take 300 mg by mouth every bedtime.       No facility-administered encounter medications on file as of 9/18/2017.      ROS Review of Systems   Constitutional: Negative for fever, chills, weight loss and fatigue. She speaks a little English which makes the ROS difficult, but not impossible, to obtain    Eyes: No change in vision. Occasional floaters  Respiratory: Negative for cough, shortness of breath and wheezing.    Cardiovascular:See HPI. No chest pain, pressure, tightness, palpitations. No orthopnea, PND, edema. No syncope or lightheadedness  Gastrointestinal: Negative for  nausea, vomiting, diarrhea   Musculoskeletal: Negative for muscle aches or pains. Shouider occasionally hurts when laying on it  Skin: Negative for rash and itching.   Neurological: No loss of sensation or motor function. No vertigo. No tremor  Psychiatric/Behavioral: No depression, , anxiety  Hematologic: No bleeding tendency or easy bruising       Objective:   /82   Pulse 86   Ht 1.372 m (4' 6\")   Wt 68.9 kg (152 lb)   SpO2 98%   BMI 36.65 kg/m²   Cholesterol 151, , HDL 35, LDL 89  Nuclear scan negative for ischemia  EKG normal  Troponins normal    Physical Exam   Exam:    Vitals:    09/18/17 0955   BP: 130/82   Pulse: 86   SpO2: 98%   Weight: 68.9 kg (152 lb)   Height: 1.372 m (4' 6\")     Constitutional: Well developed, well nourished female in no acute distress. Appears approximate stated age and provides a fair history. Repeat BP right arm " 124/70  HEENT: Normocephalic, pupils are equal and responsive. Slight bilateral arcus. Conjunctiva and sclera are clear. No xanthelasma. No diagonal ear lobe crease noted. Mucus membranes are moist and pink. Good oral hygiene  Neck: No JVD or HJR. JVP = 4-5cm H2O. Good carotid upstroke with no bruit. No lymphadenopathy.  Cardiovascular: Regular rhythm, normal rate, no ectopics. No murmur, gallop, rub or click. No lift, heave,  thrill, or cardiomegaly  Lungs: Normal effort, Clear to auscultation and percussion. No rales, rhonchi, wheezing  Abdomen: Soft, non tender. No liver, kidney, spleen or mass palpable. The abdominal aorta is not palpable. Active bowel sounds are noted and there is no bruit  Extremities:  No cyanosis, edema, clubbing. Palpable posterior tibial and dorsal pedal pulses bilaterally. Femoral pulses are good and symmetrical  Skin:   Warm and dry, no active lesions  Neurologic: Alert & oriented. Strength and sensation grossly intact. No lateralizing signs  Psychiatric:  Affect, mood, and judgement are appropriate    Assessment:   No diagnosis found.   1. Non cardiac left arm pain  2. Hypertension - well controlled    Medical Decision Making:  Today's Assessment / Status / Plan:   His pleasant  lady has well-controlled hypertension, a reasonable lipid profile, and longevity on the female side and her family. Her nuclear perfusion, EKG, echo, and troponins are all basically normal. The echo showed mild concentric left ventricular hypertrophy but normal left ventricular systolic function. I think controlling her risk factors is important but on the current hypertensive regimen her blood pressure is well-controlled. I encouraged to her to lose a few pounds and to be a little more active than her day-to-day life but otherwise she seems to be doing well and she's had a thorough evaluation which is normal. Returning here on an as-needed basis is recommended but routine follow-up with her primary care  physician is encouraged.

## 2017-10-10 NOTE — ADDENDUM NOTE
Encounter addended by: Guerline Lofton R.N. on: 10/10/2017 10:13 AM<BR>    Actions taken: Flowsheet accepted

## 2018-05-01 ENCOUNTER — HOSPITAL ENCOUNTER (OUTPATIENT)
Dept: HOSPITAL 8 - CFH | Age: 68
End: 2018-05-01
Attending: FAMILY MEDICINE
Payer: MEDICARE

## 2018-05-01 DIAGNOSIS — Z02.9: Primary | ICD-10-CM

## 2021-03-03 DIAGNOSIS — Z23 NEED FOR VACCINATION: ICD-10-CM

## 2022-01-31 ENCOUNTER — OFFICE VISIT (OUTPATIENT)
Dept: MEDICAL GROUP | Facility: CLINIC | Age: 72
End: 2022-01-31
Payer: COMMERCIAL

## 2022-01-31 VITALS
HEART RATE: 68 BPM | SYSTOLIC BLOOD PRESSURE: 124 MMHG | OXYGEN SATURATION: 95 % | BODY MASS INDEX: 32.25 KG/M2 | DIASTOLIC BLOOD PRESSURE: 82 MMHG | RESPIRATION RATE: 18 BRPM | WEIGHT: 160 LBS | HEIGHT: 59 IN

## 2022-01-31 DIAGNOSIS — I10 PRIMARY HYPERTENSION: ICD-10-CM

## 2022-01-31 DIAGNOSIS — G44.209 TENSION HEADACHE: ICD-10-CM

## 2022-01-31 DIAGNOSIS — F41.9 ANXIETY: ICD-10-CM

## 2022-01-31 DIAGNOSIS — Z11.59 NEED FOR HEPATITIS C SCREENING TEST: ICD-10-CM

## 2022-01-31 DIAGNOSIS — E66.9 OBESITY (BMI 30-39.9): ICD-10-CM

## 2022-01-31 DIAGNOSIS — Z83.3 FAMILY HISTORY OF TYPE 2 DIABETES MELLITUS: ICD-10-CM

## 2022-01-31 DIAGNOSIS — G62.9 NEUROPATHY: ICD-10-CM

## 2022-01-31 DIAGNOSIS — R53.83 OTHER FATIGUE: ICD-10-CM

## 2022-01-31 DIAGNOSIS — Z85.3 HISTORY OF BREAST CANCER: ICD-10-CM

## 2022-01-31 DIAGNOSIS — Z13.820 SCREENING FOR OSTEOPOROSIS: ICD-10-CM

## 2022-01-31 PROCEDURE — 99204 OFFICE O/P NEW MOD 45 MIN: CPT | Mod: GC | Performed by: STUDENT IN AN ORGANIZED HEALTH CARE EDUCATION/TRAINING PROGRAM

## 2022-01-31 RX ORDER — LOSARTAN POTASSIUM 50 MG/1
50 TABLET ORAL DAILY
COMMUNITY
Start: 2022-01-10

## 2022-01-31 RX ORDER — PAROXETINE 10 MG/1
10 TABLET, FILM COATED ORAL DAILY
Qty: 90 TABLET | Refills: 3 | Status: SHIPPED | OUTPATIENT
Start: 2022-01-31 | End: 2023-02-07

## 2022-01-31 RX ORDER — OMEPRAZOLE 40 MG/1
40 CAPSULE, DELAYED RELEASE ORAL DAILY
COMMUNITY
Start: 2021-12-23

## 2022-01-31 RX ORDER — GABAPENTIN 300 MG/1
300 CAPSULE ORAL
Qty: 90 CAPSULE | Refills: 3 | Status: SHIPPED | OUTPATIENT
Start: 2022-01-31 | End: 2023-01-18

## 2022-01-31 NOTE — PROGRESS NOTES
Subjective:     CC:  Diagnoses of Neuropathy, Anxiety, Obesity (BMI 30-39.9), Screening for osteoporosis, History of breast cancer, Family history of type 2 diabetes mellitus, Tension headache, Need for hepatitis C screening test, Primary hypertension, and Other fatigue were pertinent to this visit.    HISTORY OF THE PRESENT ILLNESS: Patient is a 71 y.o. female. This pleasant patient is here today to establish care and discuss general health and wellness. His/her prior PCP was Dr. Parsons.    Social: No EtOH, tobacco use, drug use. Lives with son and grandchildren. . Moved to [place] in 1982 from [facility]. No longer works.    Family hx: T2DM, CAD, HTN    Screening:  - Colonoscopy 2 years ago.  - Breast Cancer in 2006, surgery to remove tumor. Gets annual mammograms. [facility].   - Pap Smear: Can't remember last time she had.  - Blood work: TSH, lipid panel, CBC, CMP    Problem   Tension Headache    - Started 3 months ago, has had issues with this in the past.  - Worse when waking up and sometimes mid-day. Better after coffee. Sometimes takes tylenol. Never takes NSAIDs.  - Band-like in distribution.   - No phono/photosensitivity   - Sleeps well. Has been taking for Gabapentin for 5-6 years.   - Lasts 1 hour.   - Only drinks 2 drinks of water per day.      Screening for Osteoporosis   Obesity (Bmi 30-39.9)   Hypertension    Takes losartan 50mg daily. Doesn't have BP cuff at home.     History of Breast Cancer    Tumor removed 2006. Annual mammograms since.     Anxiety    Has taken Paxil for several years. Works well. Needs refill. Sometimes forgets to take and notices dizziness.         Current Outpatient Medications Ordered in Epic   Medication Sig Dispense Refill   • omeprazole (PRILOSEC) 40 MG delayed-release capsule Take 40 mg by mouth every day.     • losartan (COZAAR) 50 MG Tab Take 50 mg by mouth every day.     • gabapentin (NEURONTIN) 300 MG Cap Take 1 Capsule by mouth at bedtime. 90 Capsule 3   •  "PARoxetine (PAXIL) 10 MG Tab Take 1 Tablet by mouth every day. 90 Tablet 3   • Cholecalciferol (VITAMIN D3) 2000 UNIT Cap Take  by mouth.     • CALCIUM PO Take  by mouth.       No current Epic-ordered facility-administered medications on file.         Objective:     Exam: /82 (BP Location: Right arm, Patient Position: Sitting, BP Cuff Size: Adult)   Pulse 68   Resp 18   Ht 1.499 m (4' 11\")   Wt 72.6 kg (160 lb)   SpO2 95%  Body mass index is 32.32 kg/m².    General: Normal appearing. No distress.  HEENT: Normocephalic. Eyes conjunctiva clear lids without ptosis, pupils equal and reactive to light accommodation, ears normal shape and contour, canals are clear bilaterally, tympanic membranes are benign, nasal mucosa benign, oropharynx is without erythema, edema or exudates.   Neck: Supple without JVD or bruit. Thyroid is not enlarged.  Pulmonary: Clear to ausculation.  Normal effort. No rales, ronchi, or wheezing.  Cardiovascular: Regular rate and rhythm without murmur.    Abdomen: Soft, nontender, nondistended. Normal bowel sounds. Liver and spleen are not palpable  Neurologic: Grossly nonfocal  Lymph: No cervical or supraclavicular lymph nodes are palpable  Skin: Warm and dry.  No obvious lesions.  Musculoskeletal: Normal gait. No extremity cyanosis, clubbing, or edema.  Psych: Normal mood and affect. Alert and oriented x3. Judgment and insight is normal.    Assessment & Plan:   71 y.o. female with the following -    Problem List Items Addressed This Visit     Hypertension     Well controlled in clinic today. This could also be contributing to headaches if poorly controlled at home. Refilled Losartan 50mg. May encourage BP cuff in future.   - Lab work ordered         Relevant Medications    losartan (COZAAR) 50 MG Tab    History of breast cancer     Due for mammogram. Ordered.         Relevant Orders    MA-SCREENING MAMMO BILAT W/TOMOSYNTHESIS W/CAD    Anxiety     Well controlled on Paxil 10mg. Advised " patient against skipping days, particularly with Paxil. Refill provided.         Relevant Medications    PARoxetine (PAXIL) 10 MG Tab    Tension headache     Tension headaches. No red flag signs. Not too cumbersome for patient. Encouraged to hydrate more, 1L of water per day. NSAIDs for relief. Provided reassurance.         Relevant Medications    gabapentin (NEURONTIN) 300 MG Cap    PARoxetine (PAXIL) 10 MG Tab    Other Relevant Orders    CBC WITH DIFFERENTIAL    Screening for osteoporosis     Never had DEXA scan. Ordered.          Relevant Orders    DS-BONE DENSITY STUDY (DEXA)    Obesity (BMI 30-39.9)     + family hx of T2DM, CAD.  - Ordered lipid panel, CBC, TSH (patient reports hx of borderline TSH studies, though never medicated), CMP, A1c.          Relevant Orders    CBC WITH DIFFERENTIAL    Comp Metabolic Panel    HEMOGLOBIN A1C    Lipid Profile    TSH WITH REFLEX TO FT4      Other Visit Diagnoses     Neuropathy        Relevant Medications    gabapentin (NEURONTIN) 300 MG Cap    PARoxetine (PAXIL) 10 MG Tab    Family history of type 2 diabetes mellitus        Relevant Orders    Comp Metabolic Panel    HEMOGLOBIN A1C    Need for hepatitis C screening test        Relevant Orders    HEP C VIRUS ANTIBODY    Other fatigue        Relevant Orders    TSH WITH REFLEX TO FT4          No follow-ups on file.    Jennyfer Rome MD  PGY2

## 2022-01-31 NOTE — ASSESSMENT & PLAN NOTE
Well controlled on Paxil 10mg. Advised patient against skipping days, particularly with Paxil. Refill provided.

## 2022-01-31 NOTE — ASSESSMENT & PLAN NOTE
+ family hx of T2DM, CAD.  - Ordered lipid panel, CBC, TSH (patient reports hx of borderline TSH studies, though never medicated), CMP, A1c.

## 2022-01-31 NOTE — ASSESSMENT & PLAN NOTE
Well controlled in clinic today. This could also be contributing to headaches if poorly controlled at home. Refilled Losartan 50mg. May encourage BP cuff in future.   - Lab work ordered

## 2022-01-31 NOTE — ASSESSMENT & PLAN NOTE
Tension headaches. No red flag signs. Not too cumbersome for patient. Encouraged to hydrate more, 1L of water per day. NSAIDs for relief. Provided reassurance.

## 2022-04-12 ENCOUNTER — HOSPITAL ENCOUNTER (OUTPATIENT)
Dept: RADIOLOGY | Facility: MEDICAL CENTER | Age: 72
End: 2022-04-12
Payer: COMMERCIAL

## 2022-05-11 ENCOUNTER — TELEPHONE (OUTPATIENT)
Dept: MEDICAL GROUP | Facility: CLINIC | Age: 72
End: 2022-05-11
Payer: COMMERCIAL

## 2022-05-11 NOTE — TELEPHONE ENCOUNTER
Otf called stating the [facility] locations do not accept the patient's insurance. She was confused on where to go to get her Mammogram done, I let them know she can go to any imaging office that takes her insurance. I will fax over the order when she chooses a location.

## 2022-07-13 ENCOUNTER — OFFICE VISIT (OUTPATIENT)
Dept: MEDICAL GROUP | Facility: CLINIC | Age: 72
End: 2022-07-13
Payer: COMMERCIAL

## 2022-07-13 ENCOUNTER — RESEARCH ENCOUNTER (OUTPATIENT)
Dept: RESEARCH | Facility: WORKSITE | Age: 72
End: 2022-07-13

## 2022-07-13 VITALS
WEIGHT: 157.9 LBS | DIASTOLIC BLOOD PRESSURE: 80 MMHG | BODY MASS INDEX: 31.83 KG/M2 | SYSTOLIC BLOOD PRESSURE: 132 MMHG | HEART RATE: 88 BPM | HEIGHT: 59 IN | TEMPERATURE: 97 F | OXYGEN SATURATION: 97 %

## 2022-07-13 DIAGNOSIS — M85.851 OSTEOPENIA OF NECKS OF BOTH FEMURS: ICD-10-CM

## 2022-07-13 DIAGNOSIS — I10 PRIMARY HYPERTENSION: ICD-10-CM

## 2022-07-13 DIAGNOSIS — Z00.6 RESEARCH STUDY PATIENT: ICD-10-CM

## 2022-07-13 DIAGNOSIS — R53.83 OTHER FATIGUE: ICD-10-CM

## 2022-07-13 DIAGNOSIS — E78.5 HYPERLIPIDEMIA, UNSPECIFIED HYPERLIPIDEMIA TYPE: ICD-10-CM

## 2022-07-13 DIAGNOSIS — F41.9 ANXIETY: ICD-10-CM

## 2022-07-13 DIAGNOSIS — R23.2 HOT FLASHES: ICD-10-CM

## 2022-07-13 DIAGNOSIS — R92.30 DENSE BREAST TISSUE ON MAMMOGRAM: ICD-10-CM

## 2022-07-13 DIAGNOSIS — M85.852 OSTEOPENIA OF NECKS OF BOTH FEMURS: ICD-10-CM

## 2022-07-13 PROCEDURE — 99214 OFFICE O/P EST MOD 30 MIN: CPT | Mod: GC | Performed by: STUDENT IN AN ORGANIZED HEALTH CARE EDUCATION/TRAINING PROGRAM

## 2022-07-13 RX ORDER — ATORVASTATIN CALCIUM 20 MG/1
20 TABLET, FILM COATED ORAL EVERY EVENING
Qty: 90 TABLET | Refills: 3 | Status: SHIPPED | OUTPATIENT
Start: 2022-07-13

## 2022-07-13 ASSESSMENT — FIBROSIS 4 INDEX: FIB4 SCORE: 1.62

## 2022-07-13 NOTE — PATIENT INSTRUCTIONS
Paxil: Laverne 5mg un mercedes, 10mg el siguiente mercedes - por yaya semana. Laverne 5mg diario para semana #2. Laverne 5mg un mercedes, 0mg el siguiente mercedes - para semana #3.     Necesita empezar un medicamiento para prevenir ataque de siddharth y ataque de cerebro, que se llama atorvastatin.

## 2022-07-13 NOTE — ASSESSMENT & PLAN NOTE
Detected on annual mammogram.  Patient needs a whole breast ultrasound in order to properly screen for breast cancer.  Unfortunately, this is an out-of-pocket fee, not covered by insurance.  Forgot to offer this as an option to patient today, but will bring this up during next visit.  Patient does have a history of breast cancer with a tumor removal in 2006, so annual screenings are in patient's best interest.

## 2022-07-13 NOTE — ASSESSMENT & PLAN NOTE
Recent lipid panel showed patient to be at a 12.2% ASCVD risk in the next 10 years.  This puts her at an intermediate risk.  Patient will start a medium intensity statin.  Explained how this prevents coronary artery disease.  Based on patient's coexisting hypertension and mild transaminitis and prediabetes, she is a good candidate for the [facility] project.  Patient's sputum sample was collected today and her LF score will be obtained with next blood work.

## 2022-07-13 NOTE — ASSESSMENT & PLAN NOTE
Describes fluctuating temperatures and extreme sensitivity to the external temperature.  A TSH was ordered in the past but for some reason was not collected.  Reordered TSH.

## 2022-07-13 NOTE — ASSESSMENT & PLAN NOTE
Noted on recent DEXA scan.  Patient is already taking the appropriate amount of calcium and vitamin D daily.

## 2022-07-13 NOTE — PROGRESS NOTES
"Subjective:     CC: Lab follow up    HPI:   Karyn is a [place] speaking patient who presents today with:    Problem   Dense Breast Tissue On Mammogram   Osteopenia of Necks of Both Femurs   Hyperlipidemia   Hot Flashes    Patient reporting several hot flashes with this weather and she is wondering if it is related to menopause.  She reached menopause initially at the age of 47.     Hypertension    Takes losartan 50mg daily. Doesn't have BP cuff at home.     Anxiety    Has taken Paxil for several years. Works well.  Patient is interested in going off of the Paxil.         Current Outpatient Medications Ordered in Epic   Medication Sig Dispense Refill   • atorvastatin (LIPITOR) 20 MG Tab Take 1 Tablet by mouth every evening. 90 Tablet 3   • omeprazole (PRILOSEC) 40 MG delayed-release capsule Take 40 mg by mouth every day.     • losartan (COZAAR) 50 MG Tab Take 50 mg by mouth every day.     • gabapentin (NEURONTIN) 300 MG Cap Take 1 Capsule by mouth at bedtime. 90 Capsule 3   • PARoxetine (PAXIL) 10 MG Tab Take 1 Tablet by mouth every day. 90 Tablet 3   • Cholecalciferol (VITAMIN D3) 2000 UNIT Cap Take  by mouth.     • CALCIUM PO Take  by mouth.       No current Epic-ordered facility-administered medications on file.         Objective:     Exam:  /80 (BP Location: Right arm, Patient Position: Sitting, BP Cuff Size: Adult)   Pulse 88   Temp 36.1 °C (97 °F) (Temporal)   Ht 1.499 m (4' 11\")   Wt 71.6 kg (157 lb 14.4 oz)   SpO2 97%   BMI 31.89 kg/m²  Body mass index is 31.89 kg/m².    General: Normal appearing. No distress.  HEENT: Normocephalic. Eyes conjunctiva clear lids without ptosis, pupils equal and reactive to light accommodation, ears normal shape and contour, canals are clear bilaterally, tympanic membranes are benign, nasal mucosa benign, oropharynx is without erythema, edema or exudates.   Neck: Supple without JVD or bruit. Thyroid is not enlarged.  Pulmonary: Clear to ausculation.  Normal " effort. No rales, ronchi, or wheezing.  Cardiovascular: Regular rate and rhythm without murmur.   Abdomen: Soft, nontender, nondistended. Normal bowel sounds. Liver and spleen are not palpable  Neurologic: Grossly nonfocal  Lymph: No cervical or supraclavicular lymph nodes are palpable  Skin: Warm and dry.  No obvious lesions.  Musculoskeletal: Normal gait. No extremity cyanosis, clubbing, or edema.  Psych: Normal mood and affect. Alert and oriented x3. Judgment and insight is normal.    Assessment & Plan:     71 y.o. female with the following -     Problem List Items Addressed This Visit     Hypertension     Well-controlled today.  No symptoms of headaches, lightheadedness, dizziness, palpitations.  Continue current medical management.  Recent CMP without any kidney abnormalities.           Relevant Medications    atorvastatin (LIPITOR) 20 MG Tab    Other Relevant Orders    Referral to Genetic Research Studies    Anxiety     Patient interested in discontinuing the Paxil.  Wrote out instructions for a slow taper over the next 3 weeks.  Patient understands and if she forgot she will resort to the instructions.           Dense breast tissue on mammogram     Detected on annual mammogram.  Patient needs a whole breast ultrasound in order to properly screen for breast cancer.  Unfortunately, this is an out-of-pocket fee, not covered by insurance.  Forgot to offer this as an option to patient today, but will bring this up during next visit.  Patient does have a history of breast cancer with a tumor removal in 2006, so annual screenings are in patient's best interest.           Relevant Orders    US-SCREENING WHOLE BREAST (3D SCREENING)    Osteopenia of necks of both femurs     Noted on recent DEXA scan.  Patient is already taking the appropriate amount of calcium and vitamin D daily.           Hyperlipidemia     Recent lipid panel showed patient to be at a 12.2% ASCVD risk in the next 10 years.  This puts her at an  intermediate risk.  Patient will start a medium intensity statin.  Explained how this prevents coronary artery disease.  Based on patient's coexisting hypertension and mild transaminitis and prediabetes, she is a good candidate for the [facility] [place] project.  Patient's sputum sample was collected today and her LF score will be obtained with next blood work.           Relevant Medications    atorvastatin (LIPITOR) 20 MG Tab    Other Relevant Orders    Referral to Genetic Research Studies    Hot flashes     Describes fluctuating temperatures and extreme sensitivity to the external temperature.  A TSH was ordered in the past but for some reason was not collected.  Reordered TSH.           Relevant Medications    atorvastatin (LIPITOR) 20 MG Tab    Other Relevant Orders    TSH WITH REFLEX TO FT4      Other Visit Diagnoses     Other fatigue        Relevant Orders    TSH WITH REFLEX TO FT4            No follow-ups on file.    Jennyfer Rome MD   PGY-3

## 2022-07-13 NOTE — ASSESSMENT & PLAN NOTE
Patient interested in discontinuing the Paxil.  Wrote out instructions for a slow taper over the next 3 weeks.  Patient understands and if she forgot she will resort to the instructions.

## 2022-07-13 NOTE — ASSESSMENT & PLAN NOTE
Well-controlled today.  No symptoms of headaches, lightheadedness, dizziness, palpitations.  Continue current medical management.  Recent CMP without any kidney abnormalities.

## 2022-08-11 LAB
APOB+LDLR+PCSK9 GENE MUT ANL BLD/T: NOT DETECTED
BRCA1+BRCA2 DEL+DUP + FULL MUT ANL BLD/T: NOT DETECTED
MLH1+MSH2+MSH6+PMS2 GN DEL+DUP+FUL M: NOT DETECTED

## 2023-01-17 DIAGNOSIS — G62.9 NEUROPATHY: ICD-10-CM

## 2023-01-18 RX ORDER — GABAPENTIN 300 MG/1
CAPSULE ORAL
Qty: 90 CAPSULE | Refills: 3 | Status: SHIPPED | OUTPATIENT
Start: 2023-01-18

## 2023-02-05 DIAGNOSIS — F41.9 ANXIETY: ICD-10-CM

## 2023-02-07 RX ORDER — PAROXETINE 10 MG/1
10 TABLET, FILM COATED ORAL DAILY
Qty: 90 TABLET | Refills: 3 | Status: SHIPPED | OUTPATIENT
Start: 2023-02-07 | End: 2024-02-07 | Stop reason: SDUPTHER

## 2024-02-07 DIAGNOSIS — F41.9 ANXIETY: ICD-10-CM

## 2024-02-08 NOTE — TELEPHONE ENCOUNTER
Received request via: Pharmacy    Was the patient seen in the last year in this department? Yes    Does the patient have an active prescription (recently filled or refills available) for medication(s) requested? No    Pharmacy Name:   [facility] #[id number] - [place], [place] - [address]   Does the patient have [facility] and need 100 day supply (blood pressure, diabetes and cholesterol meds only)? Patient does not have SCP

## 2024-02-09 RX ORDER — PAROXETINE 10 MG/1
10 TABLET, FILM COATED ORAL DAILY
Qty: 90 TABLET | Refills: 0 | Status: SHIPPED | OUTPATIENT
Start: 2024-02-09